# Patient Record
Sex: MALE | Race: OTHER | HISPANIC OR LATINO
[De-identification: names, ages, dates, MRNs, and addresses within clinical notes are randomized per-mention and may not be internally consistent; named-entity substitution may affect disease eponyms.]

---

## 2018-01-16 PROBLEM — Z00.00 ENCOUNTER FOR PREVENTIVE HEALTH EXAMINATION: Status: ACTIVE | Noted: 2018-01-16

## 2018-01-24 ENCOUNTER — APPOINTMENT (OUTPATIENT)
Dept: VASCULAR SURGERY | Facility: CLINIC | Age: 73
End: 2018-01-24
Payer: MEDICARE

## 2018-01-24 VITALS — WEIGHT: 198 LBS | BODY MASS INDEX: 31.08 KG/M2 | HEIGHT: 67 IN

## 2018-01-24 DIAGNOSIS — Z80.9 FAMILY HISTORY OF MALIGNANT NEOPLASM, UNSPECIFIED: ICD-10-CM

## 2018-01-24 DIAGNOSIS — Z78.9 OTHER SPECIFIED HEALTH STATUS: ICD-10-CM

## 2018-01-24 DIAGNOSIS — I73.9 PERIPHERAL VASCULAR DISEASE, UNSPECIFIED: ICD-10-CM

## 2018-01-24 DIAGNOSIS — Z86.79 PERSONAL HISTORY OF OTHER DISEASES OF THE CIRCULATORY SYSTEM: ICD-10-CM

## 2018-01-24 DIAGNOSIS — Z86.39 PERSONAL HISTORY OF OTHER ENDOCRINE, NUTRITIONAL AND METABOLIC DISEASE: ICD-10-CM

## 2018-01-24 DIAGNOSIS — Z87.448 PERSONAL HISTORY OF OTHER DISEASES OF URINARY SYSTEM: ICD-10-CM

## 2018-01-24 DIAGNOSIS — Z85.9 PERSONAL HISTORY OF MALIGNANT NEOPLASM, UNSPECIFIED: ICD-10-CM

## 2018-01-24 PROCEDURE — 93923 UPR/LXTR ART STDY 3+ LVLS: CPT

## 2018-01-24 PROCEDURE — 99204 OFFICE O/P NEW MOD 45 MIN: CPT

## 2021-08-17 ENCOUNTER — APPOINTMENT (OUTPATIENT)
Dept: PODIATRY | Facility: CLINIC | Age: 76
End: 2021-08-17

## 2021-08-17 ENCOUNTER — NON-APPOINTMENT (OUTPATIENT)
Age: 76
End: 2021-08-17

## 2021-08-17 ENCOUNTER — APPOINTMENT (OUTPATIENT)
Dept: PODIATRY | Facility: CLINIC | Age: 76
End: 2021-08-17
Payer: MEDICARE

## 2021-08-17 VITALS — HEIGHT: 67 IN | BODY MASS INDEX: 32.02 KG/M2 | WEIGHT: 204 LBS

## 2021-08-17 DIAGNOSIS — Z85.46 PERSONAL HISTORY OF MALIGNANT NEOPLASM OF PROSTATE: ICD-10-CM

## 2021-08-17 DIAGNOSIS — E78.2 MIXED HYPERLIPIDEMIA: ICD-10-CM

## 2021-08-17 PROCEDURE — 99203 OFFICE O/P NEW LOW 30 MIN: CPT

## 2021-08-17 RX ORDER — INSULIN ASPART 100 [IU]/ML
100 INJECTION, SOLUTION INTRAVENOUS; SUBCUTANEOUS
Refills: 0 | Status: ACTIVE | COMMUNITY

## 2021-08-17 RX ORDER — INSULIN GLARGINE 100 [IU]/ML
100 INJECTION, SOLUTION SUBCUTANEOUS
Refills: 0 | Status: ACTIVE | COMMUNITY

## 2021-08-17 NOTE — PHYSICAL EXAM
[General Appearance - Alert] : alert [General Appearance - In No Acute Distress] : in no acute distress [Ankle Swelling (On Exam)] : present [Ankle Swelling Bilaterally] : bilaterally  [Ankle Swelling On The Right] : mild [1+] : left foot dorsalis pedis 1+ [Normal Foot/Ankle] : Both lower extremities were exposed and visualized. Standing exam demonstrates normal foot posture and alignment. Hindfoot exam shows no hindfoot valgus or varus [] : no rash [Skin Lesions] : no skin lesions [Sensation] : the sensory exam was normal to light touch and pinprick [No Focal Deficits] : no focal deficits [Deep Tendon Reflexes (DTR)] : deep tendon reflexes were 2+ and symmetric [Motor Exam] : the motor exam was normal [Oriented To Time, Place, And Person] : oriented to person, place, and time [Impaired Insight] : insight and judgment were intact [Affect] : the affect was normal [Varicose Veins Of Lower Extremities] : not present [Delayed in the Right Toes] : capillary refills normal in right toes [Delayed in the Left Toes] : capillary refills normal in the left toes [de-identified] : overall muscle strength testing is decreased, but consistant with the patients age and medical problems. Mild atrophy and overall weakness present.\par  [Foot Ulcer] : no foot ulcer [Skin Induration] : no skin induration

## 2021-08-17 NOTE — REVIEW OF SYSTEMS
[Lower Ext Edema] : lower extremity edema [Negative] : Musculoskeletal [Leg Claudication] : no intermittent leg claudication [Shortness Of Breath] : no shortness of breath [Wheezing] : no wheezing [Limb Weakness] : no limb weakness [Difficulty Walking] : no difficulty walking [Easy Bleeding] : no tendency for easy bleeding [Easy Bruising] : no tendency for easy bruising [FreeTextEntry5] : mild-moderate. At the time of the visit it appears clinically mild

## 2021-08-17 NOTE — PROCEDURE
[FreeTextEntry1] : Based on my physical examination and my clinical findings and the patient's description of the symptoms, a complete differential diagnosis was reviewed with the patient. Possible diagnoses as well as treatment options explained in great detail. All questions asked and answered appropriately.\par A lengthy discussion with the patient regarding edema management. I advised the patient rest, elevation, compression, and dietary restrictions all contribute to edema management. I advised the patient that she should begin with over-the-counter compression stockings and include sufficiently control edema they can advance to prescription strength. I have given the patient prescriptions for both a Roel stocking as well prescription strength stockings knee-high to 20- 30 mm of mercury closed toe. I advised the patient that if elevation rest and compression stockings do not sufficiently controlled her edema we finished the possibility of lymphedema pumps or the lymphedema clinic\par \par TUBIGRIP (temporary) dispensed today\par \par Rx Pressure stocking\par \par follow up appt 4 weeks\par If no improvement witll refer to vascular for workup\par \par

## 2021-08-17 NOTE — HISTORY OF PRESENT ILLNESS
[FreeTextEntry1] : Location: dorsum, medial and lateral aspect of both ankles and dorsum of both feet\par Duration: 1 month after a trip to [place] patient noticed acute onset of swelling and over time the swelling progressed to the foot and now accompanies occasional pain\par Acute: yes\par Past Tx: [place] in [place] (gave water pills) however has been told by PCP here to stop. Patient states they weren't working any way\par Exacerbated by: standing and weight bearing as the day goes on\par Prior Hx: no\par \par The patient states this happens every time they go away, however it usually subsides. This time it hasn't\par

## 2021-08-17 NOTE — REASON FOR VISIT
[Initial Visit] : an initial visit for [Other:___] : [unfilled] [FreeTextEntry2] : swelling and pain to both ankles and now both feet

## 2021-09-14 ENCOUNTER — APPOINTMENT (OUTPATIENT)
Dept: PODIATRY | Facility: CLINIC | Age: 76
End: 2021-09-14
Payer: MEDICARE

## 2021-09-14 VITALS — WEIGHT: 204 LBS | HEIGHT: 67 IN | BODY MASS INDEX: 32.02 KG/M2

## 2021-09-14 DIAGNOSIS — E11.9 TYPE 2 DIABETES MELLITUS W/OUT COMPLICATIONS: ICD-10-CM

## 2021-09-14 DIAGNOSIS — M25.471 EFFUSION, RIGHT ANKLE: ICD-10-CM

## 2021-09-14 DIAGNOSIS — Q82.0 HEREDITARY LYMPHEDEMA: ICD-10-CM

## 2021-09-14 DIAGNOSIS — M25.472 EFFUSION, LEFT ANKLE: ICD-10-CM

## 2021-09-14 PROCEDURE — 99213 OFFICE O/P EST LOW 20 MIN: CPT

## 2021-09-14 RX ORDER — PEN NEEDLE, DIABETIC 29 G X1/2"
31G X 8 MM NEEDLE, DISPOSABLE MISCELLANEOUS
Qty: 200 | Refills: 0 | Status: ACTIVE | COMMUNITY
Start: 2021-06-21

## 2021-09-14 NOTE — REASON FOR VISIT
[Follow-Up Visit] : a follow-up visit for [Other:___] : [unfilled] [FreeTextEntry2] : swelling and pain to both ankles and feet

## 2021-09-14 NOTE — REVIEW OF SYSTEMS
[Lower Ext Edema] : lower extremity edema [Negative] : Musculoskeletal [Leg Claudication] : no intermittent leg claudication [Limb Weakness] : no limb weakness [Difficulty Walking] : no difficulty walking [Easy Bleeding] : no tendency for easy bleeding [Easy Bruising] : no tendency for easy bruising [FreeTextEntry5] : improved

## 2021-09-14 NOTE — PROCEDURE
[FreeTextEntry1] : continue using the pressure stockings as prescribed since they are seeing goood relief from them\par A complete and thorough evaluation of the type of shoes they should be wearing and type of shoes for this time of year was discussed with patient.\par \par Rx given

## 2021-09-14 NOTE — HISTORY OF PRESENT ILLNESS
[FreeTextEntry1] : Location: dorsum, medial and lateral aspect of both ankles and dorsum of both feet\par Duration: 1 month after a trip to [place] patient noticed acute onset of swelling and over time the swelling progressed to the foot and now accompanies occasional pain\par Acute: yes\par Past Tx: [place] (gave water pills) however has been told by PCP here to stop. Patient states they weren't working any way\par Exacerbated by: standing and weight bearing as the day goes on\par Prior Hx: no\par \par patient wearing compression stockings

## 2021-09-14 NOTE — PHYSICAL EXAM
[General Appearance - Alert] : alert [General Appearance - In No Acute Distress] : in no acute distress [Ankle Swelling (On Exam)] : present [Ankle Swelling Bilaterally] : bilaterally  [Ankle Swelling On The Right] : mild [1+] : left foot dorsalis pedis 1+ [Normal Foot/Ankle] : Both lower extremities were exposed and visualized. Standing exam demonstrates normal foot posture and alignment. Hindfoot exam shows no hindfoot valgus or varus [] : no rash [Skin Lesions] : no skin lesions [Sensation] : the sensory exam was normal to light touch and pinprick [No Focal Deficits] : no focal deficits [Deep Tendon Reflexes (DTR)] : deep tendon reflexes were 2+ and symmetric [Motor Exam] : the motor exam was normal [Oriented To Time, Place, And Person] : oriented to person, place, and time [Impaired Insight] : insight and judgment were intact [Affect] : the affect was normal [Varicose Veins Of Lower Extremities] : not present [Delayed in the Right Toes] : capillary refills normal in right toes [Delayed in the Left Toes] : capillary refills normal in the left toes [de-identified] : overall muscle strength testing is decreased, but consistant with the patients age and medical problems. Mild atrophy and overall weakness present.\par  [Foot Ulcer] : no foot ulcer [Skin Induration] : no skin induration

## 2021-12-08 ENCOUNTER — NON-APPOINTMENT (OUTPATIENT)
Age: 76
End: 2021-12-08

## 2021-12-08 ENCOUNTER — APPOINTMENT (OUTPATIENT)
Dept: CARDIOLOGY | Facility: CLINIC | Age: 76
End: 2021-12-08
Payer: MEDICARE

## 2021-12-08 VITALS
HEART RATE: 58 BPM | BODY MASS INDEX: 31.79 KG/M2 | RESPIRATION RATE: 12 BRPM | WEIGHT: 203 LBS | SYSTOLIC BLOOD PRESSURE: 156 MMHG | DIASTOLIC BLOOD PRESSURE: 67 MMHG | OXYGEN SATURATION: 98 %

## 2021-12-08 VITALS — SYSTOLIC BLOOD PRESSURE: 152 MMHG | DIASTOLIC BLOOD PRESSURE: 70 MMHG

## 2021-12-08 VITALS
SYSTOLIC BLOOD PRESSURE: 156 MMHG | WEIGHT: 203.13 LBS | HEART RATE: 58 BPM | OXYGEN SATURATION: 98 % | DIASTOLIC BLOOD PRESSURE: 67 MMHG | BODY MASS INDEX: 31.81 KG/M2

## 2021-12-08 DIAGNOSIS — N18.9 CHRONIC KIDNEY DISEASE, UNSPECIFIED: ICD-10-CM

## 2021-12-08 DIAGNOSIS — I10 ESSENTIAL (PRIMARY) HYPERTENSION: ICD-10-CM

## 2021-12-08 PROCEDURE — 93000 ELECTROCARDIOGRAM COMPLETE: CPT

## 2021-12-08 PROCEDURE — 99204 OFFICE O/P NEW MOD 45 MIN: CPT

## 2021-12-08 RX ORDER — FUROSEMIDE 40 MG/1
40 TABLET ORAL DAILY
Refills: 0 | Status: ACTIVE | COMMUNITY

## 2021-12-08 RX ORDER — CARVEDILOL 6.25 MG/1
6.25 TABLET, FILM COATED ORAL TWICE DAILY
Refills: 0 | Status: ACTIVE | COMMUNITY

## 2021-12-08 RX ORDER — HYDRALAZINE HYDROCHLORIDE 50 MG/1
50 TABLET ORAL TWICE DAILY
Qty: 180 | Refills: 2 | Status: ACTIVE | COMMUNITY
Start: 2021-12-08 | End: 1900-01-01

## 2021-12-08 RX ORDER — NIFEDIPINE 30 MG/1
30 TABLET, EXTENDED RELEASE ORAL AT BEDTIME
Refills: 0 | Status: ACTIVE | COMMUNITY

## 2021-12-08 RX ORDER — DOXAZOSIN 4 MG/1
4 TABLET ORAL
Refills: 0 | Status: ACTIVE | COMMUNITY

## 2021-12-08 RX ORDER — LOSARTAN POTASSIUM 100 MG/1
100 TABLET, FILM COATED ORAL DAILY
Refills: 0 | Status: ACTIVE | COMMUNITY

## 2021-12-08 RX ORDER — SENNOSIDES 8.6 MG/1
8.6 CAPSULE, GELATIN COATED ORAL
Refills: 0 | Status: ACTIVE | COMMUNITY

## 2021-12-08 RX ORDER — NIFEDIPINE 90 MG/1
90 TABLET, EXTENDED RELEASE ORAL EVERY MORNING
Qty: 90 | Refills: 2 | Status: ACTIVE | COMMUNITY
Start: 2021-09-02

## 2021-12-08 RX ORDER — ATORVASTATIN CALCIUM 20 MG/1
20 TABLET, FILM COATED ORAL DAILY
Refills: 0 | Status: ACTIVE | COMMUNITY

## 2021-12-08 RX ORDER — HYDRALAZINE HYDROCHLORIDE 25 MG/1
25 TABLET ORAL TWICE DAILY
Refills: 0 | Status: ACTIVE | COMMUNITY

## 2021-12-08 NOTE — PHYSICAL EXAM
[Well Developed] : well developed [Well Nourished] : well nourished [No Acute Distress] : no acute distress [Normal Conjunctiva] : normal conjunctiva [Normal Venous Pressure] : normal venous pressure [No Carotid Bruit] : no carotid bruit [Normal Rate] : normal [Rhythm Regular] : regular [Normal S1] : normal S1 [Normal S2] : normal S2 [II] : a grade 2 [2+] : left 2+ [Clear Lung Fields] : clear lung fields [Good Air Entry] : good air entry [No Respiratory Distress] : no respiratory distress  [Normal Gait] : normal gait [No Edema] : no edema [No Cyanosis] : no cyanosis [No Clubbing] : no clubbing [Moves all extremities] : moves all extremities [No Focal Deficits] : no focal deficits [Normal Speech] : normal speech [Alert and Oriented] : alert and oriented [Normal memory] : normal memory [S3] : no S3 [S4] : no S4 [___ +] : bilateral [unfilled]U+ pretibial pitting edema [Right Carotid Bruit] : no bruit heard over the right carotid [Left Carotid Bruit] : no bruit heard over the left carotid

## 2021-12-08 NOTE — HISTORY OF PRESENT ILLNESS
[FreeTextEntry1] : 75 yo male with hypertension, DM type 2, CKD, hyperlipidemia, who presents today for hypertension management.\par Patient denies chest pain, dyspnea, palpitations, syncope, melena, hematochezia, or hematemesis. He only reports intermittent leg edema, which has responded to oral furosemide.\par \par PMD: [place] [facility]

## 2021-12-08 NOTE — ASSESSMENT
[FreeTextEntry1] : 75 yo male with hypertension, DM type 2, CKD, and hyperlipidemia.\par ECG today shows sinus bradycardia, rate 57 bpm, 1st degree AV block, and nonspecific T-wave abnormalities.\par \par BP is not adequately controlled on current regimen of carvedilol 6.25 mg po bid, doxazosin 4 mg po daily, losartan 100 mg po daily, and nifedipine ER 90 mg qam and 30 mg qpm, and hydralazine 25 mg po bid.\par Will increase hydralazine to 50 mg po bid. Patient to call if BP remains uncontrolled. Otherwise RTC in 2 weeks\par \par Given cardiac murmur, will perform echocardiogram to assess for valvular disease.\par \par Will continue atorvastatin 20 mg po daily for hyperlipidemia management.

## 2021-12-08 NOTE — CARDIOLOGY SUMMARY
[de-identified] : \par 12/8/21 ECG: Sinus bradycardia, rate 57 bpm, 1st degree AV block, nonspecific T-wave abnormalities

## 2021-12-21 ENCOUNTER — APPOINTMENT (OUTPATIENT)
Dept: CARDIOLOGY | Facility: CLINIC | Age: 76
End: 2021-12-21
Payer: MEDICARE

## 2021-12-21 DIAGNOSIS — R01.1 CARDIAC MURMUR, UNSPECIFIED: ICD-10-CM

## 2021-12-21 DIAGNOSIS — R60.0 LOCALIZED EDEMA: ICD-10-CM

## 2021-12-21 PROCEDURE — 93306 TTE W/DOPPLER COMPLETE: CPT

## 2021-12-22 ENCOUNTER — NON-APPOINTMENT (OUTPATIENT)
Age: 76
End: 2021-12-22

## 2021-12-22 PROBLEM — R60.0 BILATERAL EDEMA OF LOWER EXTREMITY: Status: ACTIVE | Noted: 2021-08-17

## 2021-12-22 PROBLEM — R01.1 CARDIAC MURMUR: Status: ACTIVE | Noted: 2021-12-08

## 2022-04-25 ENCOUNTER — HOSPITAL ENCOUNTER (OUTPATIENT)
Dept: HOSPITAL 74 - FASU | Age: 77
Discharge: HOME | End: 2022-04-25
Attending: STUDENT IN AN ORGANIZED HEALTH CARE EDUCATION/TRAINING PROGRAM
Payer: COMMERCIAL

## 2022-04-25 VITALS — HEART RATE: 65 BPM | DIASTOLIC BLOOD PRESSURE: 74 MMHG | SYSTOLIC BLOOD PRESSURE: 128 MMHG

## 2022-04-25 VITALS — TEMPERATURE: 97.8 F

## 2022-04-25 VITALS — BODY MASS INDEX: 29.5 KG/M2

## 2022-04-25 DIAGNOSIS — H25.12: Primary | ICD-10-CM

## 2022-04-25 PROCEDURE — 08RK3JZ REPLACEMENT OF LEFT LENS WITH SYNTHETIC SUBSTITUTE, PERCUTANEOUS APPROACH: ICD-10-PCS | Performed by: STUDENT IN AN ORGANIZED HEALTH CARE EDUCATION/TRAINING PROGRAM

## 2022-04-25 PROCEDURE — 66984 XCAPSL CTRC RMVL W/O ECP: CPT

## 2022-04-25 RX ADMIN — KETOROLAC TROMETHAMINE SCH DROP: 5 SOLUTION OPHTHALMIC at 09:05

## 2022-04-25 RX ADMIN — CYCLOPENTOLATE HYDROCHLORIDE SCH DROP: 10 SOLUTION/ DROPS OPHTHALMIC at 09:00

## 2022-04-25 RX ADMIN — KETOROLAC TROMETHAMINE SCH DROP: 5 SOLUTION OPHTHALMIC at 09:00

## 2022-04-25 RX ADMIN — PHENYLEPHRINE HYDROCHLORIDE SCH DROP: 0.25 SPRAY NASAL at 09:00

## 2022-04-25 RX ADMIN — PHENYLEPHRINE HYDROCHLORIDE SCH DROP: 0.25 SPRAY NASAL at 09:10

## 2022-04-25 RX ADMIN — TROPICAMIDE SCH DROP: 10 SOLUTION/ DROPS OPHTHALMIC at 09:05

## 2022-04-25 RX ADMIN — OFLOXACIN SCH DROP: 3 SOLUTION/ DROPS OPHTHALMIC at 09:10

## 2022-04-25 RX ADMIN — TROPICAMIDE SCH DROP: 10 SOLUTION/ DROPS OPHTHALMIC at 09:10

## 2022-04-25 RX ADMIN — OFLOXACIN SCH DROP: 3 SOLUTION/ DROPS OPHTHALMIC at 09:00

## 2022-04-25 RX ADMIN — PHENYLEPHRINE HYDROCHLORIDE SCH DROP: 0.25 SPRAY NASAL at 09:05

## 2022-04-25 RX ADMIN — OFLOXACIN SCH DROP: 3 SOLUTION/ DROPS OPHTHALMIC at 09:05

## 2022-04-25 RX ADMIN — CYCLOPENTOLATE HYDROCHLORIDE SCH DROP: 10 SOLUTION/ DROPS OPHTHALMIC at 09:05

## 2022-04-25 RX ADMIN — CYCLOPENTOLATE HYDROCHLORIDE SCH DROP: 10 SOLUTION/ DROPS OPHTHALMIC at 09:10

## 2022-04-25 RX ADMIN — TROPICAMIDE SCH DROP: 10 SOLUTION/ DROPS OPHTHALMIC at 09:00

## 2022-04-25 RX ADMIN — KETOROLAC TROMETHAMINE SCH DROP: 5 SOLUTION OPHTHALMIC at 09:10

## 2022-05-16 ENCOUNTER — HOSPITAL ENCOUNTER (OUTPATIENT)
Dept: HOSPITAL 74 - FASU | Age: 77
Discharge: HOME | End: 2022-05-16
Attending: STUDENT IN AN ORGANIZED HEALTH CARE EDUCATION/TRAINING PROGRAM
Payer: COMMERCIAL

## 2022-05-16 VITALS — SYSTOLIC BLOOD PRESSURE: 132 MMHG | HEART RATE: 54 BPM | DIASTOLIC BLOOD PRESSURE: 67 MMHG

## 2022-05-16 VITALS — BODY MASS INDEX: 29.5 KG/M2

## 2022-05-16 VITALS — TEMPERATURE: 97 F

## 2022-05-16 DIAGNOSIS — H25.11: Primary | ICD-10-CM

## 2022-05-16 PROCEDURE — 08RJ3JZ REPLACEMENT OF RIGHT LENS WITH SYNTHETIC SUBSTITUTE, PERCUTANEOUS APPROACH: ICD-10-PCS | Performed by: STUDENT IN AN ORGANIZED HEALTH CARE EDUCATION/TRAINING PROGRAM

## 2022-05-16 PROCEDURE — 66984 XCAPSL CTRC RMVL W/O ECP: CPT
